# Patient Record
Sex: MALE | Race: WHITE
[De-identification: names, ages, dates, MRNs, and addresses within clinical notes are randomized per-mention and may not be internally consistent; named-entity substitution may affect disease eponyms.]

---

## 2019-07-29 ENCOUNTER — HOSPITAL ENCOUNTER (EMERGENCY)
Dept: HOSPITAL 47 - EC | Age: 69
Discharge: HOME | End: 2019-07-29
Payer: COMMERCIAL

## 2019-07-29 VITALS
TEMPERATURE: 97.9 F | SYSTOLIC BLOOD PRESSURE: 145 MMHG | DIASTOLIC BLOOD PRESSURE: 75 MMHG | HEART RATE: 97 BPM | RESPIRATION RATE: 18 BRPM

## 2019-07-29 DIAGNOSIS — F17.200: ICD-10-CM

## 2019-07-29 DIAGNOSIS — J44.9: ICD-10-CM

## 2019-07-29 DIAGNOSIS — Z79.890: ICD-10-CM

## 2019-07-29 DIAGNOSIS — Z79.899: ICD-10-CM

## 2019-07-29 DIAGNOSIS — Z79.01: ICD-10-CM
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
## 2019-07-29 DIAGNOSIS — Z79.4: ICD-10-CM

## 2019-07-29 DIAGNOSIS — S59.912A: Primary | ICD-10-CM

## 2019-07-29 DIAGNOSIS — H54.61: ICD-10-CM

## 2019-07-29 DIAGNOSIS — E11.622: ICD-10-CM

## 2019-07-29 DIAGNOSIS — L97.919: ICD-10-CM

## 2019-07-29 DIAGNOSIS — Z86.718: ICD-10-CM

## 2019-07-29 DIAGNOSIS — I10: ICD-10-CM

## 2019-07-29 DIAGNOSIS — E78.5: ICD-10-CM

## 2019-07-29 PROCEDURE — 72125 CT NECK SPINE W/O DYE: CPT

## 2019-07-29 PROCEDURE — 71046 X-RAY EXAM CHEST 2 VIEWS: CPT

## 2019-07-29 PROCEDURE — 70450 CT HEAD/BRAIN W/O DYE: CPT

## 2019-07-29 PROCEDURE — 99285 EMERGENCY DEPT VISIT HI MDM: CPT
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
## 2019-08-19 ENCOUNTER — HOSPITAL ENCOUNTER (EMERGENCY)
Dept: HOSPITAL 47 - EC | Age: 69
Discharge: HOME | End: 2019-08-19
Payer: MEDICARE

## 2019-08-19 VITALS — DIASTOLIC BLOOD PRESSURE: 80 MMHG | HEART RATE: 89 BPM | SYSTOLIC BLOOD PRESSURE: 129 MMHG

## 2019-08-19 VITALS — TEMPERATURE: 98.6 F | RESPIRATION RATE: 16 BRPM

## 2019-08-19 DIAGNOSIS — Z79.890: ICD-10-CM

## 2019-08-19 DIAGNOSIS — Z79.01: ICD-10-CM

## 2019-08-19 DIAGNOSIS — F17.200: ICD-10-CM

## 2019-08-19 DIAGNOSIS — Z79.899: ICD-10-CM

## 2019-08-19 DIAGNOSIS — I10: ICD-10-CM

## 2019-08-19 DIAGNOSIS — E11.649: Primary | ICD-10-CM

## 2019-08-19 DIAGNOSIS — Z79.4: ICD-10-CM

## 2019-08-19 DIAGNOSIS — Z86.718: ICD-10-CM

## 2019-08-19 DIAGNOSIS — E78.5: ICD-10-CM

## 2019-08-19 LAB
ALBUMIN SERPL-MCNC: 4.1 G/DL (ref 3.5–5)
ALP SERPL-CCNC: 99 U/L (ref 38–126)
ALT SERPL-CCNC: 7 U/L (ref 21–72)
ANION GAP SERPL CALC-SCNC: 8 MMOL/L
AST SERPL-CCNC: 34 U/L (ref 17–59)
BASOPHILS # BLD AUTO: 0.1 K/UL (ref 0–0.2)
BASOPHILS NFR BLD AUTO: 0 %
BUN SERPL-SCNC: 16 MG/DL (ref 9–20)
CALCIUM SPEC-MCNC: 8.8 MG/DL (ref 8.4–10.2)
CHLORIDE SERPL-SCNC: 102 MMOL/L (ref 98–107)
CO2 SERPL-SCNC: 29 MMOL/L (ref 22–30)
EOSINOPHIL # BLD AUTO: 0.2 K/UL (ref 0–0.7)
EOSINOPHIL NFR BLD AUTO: 1 %
ERYTHROCYTE [DISTWIDTH] IN BLOOD BY AUTOMATED COUNT: 4.54 M/UL (ref 4.3–5.9)
ERYTHROCYTE [DISTWIDTH] IN BLOOD: 14.9 % (ref 11.5–15.5)
GLUCOSE SERPL-MCNC: 96 MG/DL (ref 74–99)
HCT VFR BLD AUTO: 41.1 % (ref 39–53)
HGB BLD-MCNC: 14.2 GM/DL (ref 13–17.5)
LYMPHOCYTES # SPEC AUTO: 1.2 K/UL (ref 1–4.8)
LYMPHOCYTES NFR SPEC AUTO: 10 %
MAGNESIUM SPEC-SCNC: 1.9 MG/DL (ref 1.6–2.3)
MCH RBC QN AUTO: 31.4 PG (ref 25–35)
MCHC RBC AUTO-ENTMCNC: 34.7 G/DL (ref 31–37)
MCV RBC AUTO: 90.6 FL (ref 80–100)
MONOCYTES # BLD AUTO: 0.5 K/UL (ref 0–1)
MONOCYTES NFR BLD AUTO: 4 %
NEUTROPHILS # BLD AUTO: 9.4 K/UL (ref 1.3–7.7)
NEUTROPHILS NFR BLD AUTO: 83 %
PLATELET # BLD AUTO: 251 K/UL (ref 150–450)
POTASSIUM SERPL-SCNC: 5 MMOL/L (ref 3.5–5.1)
PROT SERPL-MCNC: 7 G/DL (ref 6.3–8.2)
SODIUM SERPL-SCNC: 139 MMOL/L (ref 137–145)
WBC # BLD AUTO: 11.3 K/UL (ref 3.8–10.6)

## 2019-08-19 PROCEDURE — 85025 COMPLETE CBC W/AUTO DIFF WBC: CPT

## 2019-08-19 PROCEDURE — 96360 HYDRATION IV INFUSION INIT: CPT

## 2019-08-19 PROCEDURE — 84100 ASSAY OF PHOSPHORUS: CPT

## 2019-08-19 PROCEDURE — 83735 ASSAY OF MAGNESIUM: CPT

## 2019-08-19 PROCEDURE — 99285 EMERGENCY DEPT VISIT HI MDM: CPT

## 2019-08-19 PROCEDURE — 36415 COLL VENOUS BLD VENIPUNCTURE: CPT

## 2019-08-19 PROCEDURE — 82009 KETONE BODYS QUAL: CPT

## 2019-08-19 PROCEDURE — 80053 COMPREHEN METABOLIC PANEL: CPT
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
## 2020-10-19 ENCOUNTER — HOSPITAL ENCOUNTER (OUTPATIENT)
Dept: HOSPITAL 47 - BARWHC3 | Age: 70
Discharge: HOME | End: 2020-10-19
Attending: SURGERY
Payer: MEDICARE

## 2020-10-19 VITALS
SYSTOLIC BLOOD PRESSURE: 159 MMHG | DIASTOLIC BLOOD PRESSURE: 84 MMHG | TEMPERATURE: 98.1 F | HEART RATE: 86 BPM | RESPIRATION RATE: 18 BRPM

## 2020-10-19 VITALS — BODY MASS INDEX: 35.8 KG/M2

## 2020-10-19 DIAGNOSIS — F17.200: ICD-10-CM

## 2020-10-19 DIAGNOSIS — Z98.84: ICD-10-CM

## 2020-10-19 DIAGNOSIS — Z46.51: Primary | ICD-10-CM

## 2020-10-19 PROCEDURE — 99212 OFFICE O/P EST SF 10 MIN: CPT

## 2020-10-19 NOTE — P.HPBAR
Bariatric H&P





- History & Physicial


H&P Date: 10/19/20


History & Physicial: 


Visit/CC: lap band f/u





Patient initial contact: 





Initial weight: 


Initial weight in pounds: 


Height: 5 ft 8 in


Initial BMI: 





Last weight: 





Current weight: 106.866 kg


Current weight in pounds: 235.60


Current BMI: 35.8





Ideal body weight (based on NIH guidelines): 69.853 kg





Excess body weight loss: 








The patient is a 70 year-old M who presents for Bariatric Assessment.  Patient 

presents today for lap band follow up.  He has complaints of some epigastric 

pain and intermittent dysphagia previous requesting fluid removed.














Past Medical History


Past Medical History: COPD, Diabetes Mellitus, Deep Vein Thrombosis (DVT), 

Hyperlipidemia, Hypertension


Additional Past Medical History / Comment(s): blind in r eye,  fractured neck


History of Any Multi-Drug Resistant Organisms: None Reported


Past Surgical History: Bariatric Surgery


Additional Past Surgical History / Comment(s): back surg neck surg ; thyroid 

removed; lap band 2010


Past Anesthesia/Blood Transfusion Reactions: No Reported Reaction


Past Psychological History: No Psychological Hx Reported


Smoking Status: Current some day smoker


Past Alcohol Use History: Rare


Past Drug Use History: None Reported


Additional Drug Use History / Comment(s): smokes cigars





Surgical - Exam


                                   Vital Signs











Temp Pulse Resp BP


 


 98.1 F   86   18   159/84 


 


 10/19/20 13:32  10/19/20 13:32  10/19/20 13:32  10/19/20 13:32














- General


well developed, well nourished





- Eyes


PERRL





- ENT


normal pinna





- Neck


no masses





- Respiratory


normal expansion





- Cardiovascular


Rhythm: regular





- Abdomen


Abdomen: soft, non tender





Bariatric Assessment & Plan


Plan: 





Patient's lap band was emptied.  He had 11 mL remove the band.  He'll follow-up 

in 4 weeks.





Bariatric Checklist


Checklist: 


Plan: 





Checklist: 





EGD: 


1. Hiatal hernia: 


2. H. Pylori: 





HgbA1c: 





Vitamin D: 





Smoking: Current some day smoker





Primary care physician referral: Dr Calderón





Psychiatry clearance: 





Cardiology clearance: 





Sleep study: 





Diet journal: 





VTE risk score: 





VTE risk level: 





Rehab needs at discharge:

## 2020-11-23 ENCOUNTER — HOSPITAL ENCOUNTER (OUTPATIENT)
Dept: HOSPITAL 47 - BARWHC3 | Age: 70
Discharge: HOME | End: 2020-11-23
Attending: SURGERY
Payer: MEDICARE

## 2020-11-23 VITALS
HEART RATE: 81 BPM | TEMPERATURE: 97.4 F | SYSTOLIC BLOOD PRESSURE: 134 MMHG | DIASTOLIC BLOOD PRESSURE: 64 MMHG | RESPIRATION RATE: 18 BRPM

## 2020-11-23 DIAGNOSIS — Z48.815: Primary | ICD-10-CM

## 2020-11-23 DIAGNOSIS — Z98.84: ICD-10-CM

## 2020-11-23 PROCEDURE — 99211 OFF/OP EST MAY X REQ PHY/QHP: CPT

## 2020-12-03 NOTE — P.HPBAR
Bariatric H&P





- History & Physicial


H&P Date: 12/03/20


History & Physicial: 


Visit/CC: lap band follow up





Patient initial contact: 





Initial weight: 


Initial weight in pounds: 


Height: 5 ft 8 in


Initial BMI: 





Last weight: 





Current weight: 105.37 kg


Current weight in pounds: 


Current BMI: 





Ideal body weight (based on NIH guidelines): 





Excess body weight loss: 








The patient is a 70 year-old M who presents for Bariatric Assessment.  Patient 

presents today for LAP-BAND follow-up.  He has had improvement in his GERD 

symptoms.





Past Medical History


Past Medical History: COPD, Diabetes Mellitus, Deep Vein Thrombosis (DVT), 

Hyperlipidemia, Hypertension


Additional Past Medical History / Comment(s): blind in r eye,  fractured neck


History of Any Multi-Drug Resistant Organisms: None Reported


Past Surgical History: Bariatric Surgery


Additional Past Surgical History / Comment(s): back surg neck surg ; thyroid 

removed; lap band 2010


Past Anesthesia/Blood Transfusion Reactions: No Reported Reaction


Past Psychological History: No Psychological Hx Reported


Smoking Status: Current some day smoker


Past Alcohol Use History: Rare


Past Drug Use History: None Reported


Additional Drug Use History / Comment(s): smokes cigars





Surgical - Exam


                                   Vital Signs











Temp Pulse Resp BP


 


 97.4 F L  81   18   134/64 


 


 11/23/20 13:57  11/23/20 13:57  11/23/20 13:57  11/23/20 13:57














- General


well developed, well nourished, no distress





- Eyes


PERRL





- ENT


normal pinna





- Neck


no masses





- Respiratory


normal expansion





- Cardiovascular


Rhythm: regular





- Abdomen


Abdomen: soft, non tender





Bariatric Assessment & Plan


Plan: 





Status post lap band procedure.  Patient's had some GERD.  His GERD is improved.

 He will follow-up in 2 weeks for a fill of his band at that time if his GERD 

symptoms are still improved.





Bariatric Checklist


Checklist: 


Plan: 





Checklist: 





EGD: 


1. Hiatal hernia: 


2. H. Pylori: 





HgbA1c: 





Vitamin D: 





Smoking: Current some day smoker





Primary care physician referral: Dr Calderón





Psychiatry clearance: 





Cardiology clearance: 





Sleep study: 





Diet journal: 





VTE risk score: 





VTE risk level: 





Rehab needs at discharge:

## 2020-12-07 ENCOUNTER — HOSPITAL ENCOUNTER (OUTPATIENT)
Dept: HOSPITAL 47 - BARWHC3 | Age: 70
Discharge: HOME | End: 2020-12-07
Attending: SURGERY
Payer: MEDICARE

## 2020-12-07 VITALS
DIASTOLIC BLOOD PRESSURE: 79 MMHG | SYSTOLIC BLOOD PRESSURE: 146 MMHG | TEMPERATURE: 98 F | RESPIRATION RATE: 18 BRPM | HEART RATE: 89 BPM

## 2020-12-07 DIAGNOSIS — Z46.51: Primary | ICD-10-CM

## 2020-12-07 LAB — GLUCOSE BLD-MCNC: 267 MG/DL (ref 75–99)

## 2020-12-07 PROCEDURE — 99211 OFF/OP EST MAY X REQ PHY/QHP: CPT

## 2020-12-07 NOTE — P.HPBAR
Bariatric H&P





- History & Physicial


H&P Date: 12/07/20


History & Physicial: 


Visit/CC: follow up / lap band





Patient initial contact: 





Initial weight: 


Initial weight in pounds: 


Height: 5 ft 8 in


Initial BMI: 





Last weight: 





Current weight: 109.316 kg


Current weight in pounds: 


Current BMI: 





Ideal body weight (based on NIH guidelines): 





Excess body weight loss: 








The patient is a 70 year-old M who presents for Bariatric Assessment.  Patient 

presents for lap band follow.  He had his LAP-BAND M.D. last visit.  He states 

his GERD symptoms have resolved.  He is not requesting a fill.  He is clean 10 

pounds since last month.














Past Medical History


Past Medical History: COPD, Diabetes Mellitus, Deep Vein Thrombosis (DVT), 

Hyperlipidemia, Hypertension


Additional Past Medical History / Comment(s): blind in r eye,  fractured neck


History of Any Multi-Drug Resistant Organisms: None Reported


Past Surgical History: Bariatric Surgery


Additional Past Surgical History / Comment(s): back surg neck surg ; thyroid 

removed; lap band 2010


Past Anesthesia/Blood Transfusion Reactions: No Reported Reaction


Past Psychological History: No Psychological Hx Reported


Smoking Status: Current some day smoker


Past Alcohol Use History: Rare


Past Drug Use History: None Reported


Additional Drug Use History / Comment(s): smokes cigars





Surgical - Exam


                                   Vital Signs











Temp Pulse Resp BP


 


 98 F   89   18   146/79 


 


 12/07/20 14:06  12/07/20 14:06  12/07/20 14:06  12/07/20 14:06














- General


well developed, well nourished, no distress





- Eyes


PERRL





- ENT


normal pinna





- Neck


no masses





- Respiratory


normal expansion





- Cardiovascular


Rhythm: regular





- Abdomen


Abdomen: soft, non tender





Results





- Labs


                  Abnormal Lab Results - Last 24 Hours (Table)











  12/07/20 Range/Units





  14:18 


 


POC Glucose (mg/dL)  267 H  (75-99)  mg/dL














Bariatric Assessment & Plan


Plan: 





Patient's GERD symptoms are improved.  He'll follow-up in one month.  The plan 

for adjustment of his band at that time.





Bariatric Checklist


Checklist: 


Plan: 





Checklist: 





EGD: 


1. Hiatal hernia: 


2. H. Pylori: 





HgbA1c: 





Vitamin D: 





Smoking: Current some day smoker





Primary care physician referral: Dr Calderón





Psychiatry clearance: 





Cardiology clearance: 





Sleep study: 





Diet journal: 





VTE risk score: 





VTE risk level: 





Rehab needs at discharge:

## 2021-01-11 ENCOUNTER — HOSPITAL ENCOUNTER (OUTPATIENT)
Dept: HOSPITAL 47 - BARWHC3 | Age: 71
Discharge: HOME | End: 2021-01-11
Attending: SURGERY
Payer: MEDICARE

## 2021-01-11 VITALS
HEART RATE: 84 BPM | SYSTOLIC BLOOD PRESSURE: 176 MMHG | DIASTOLIC BLOOD PRESSURE: 75 MMHG | TEMPERATURE: 97.9 F | RESPIRATION RATE: 18 BRPM

## 2021-01-11 VITALS — BODY MASS INDEX: 36.3 KG/M2

## 2021-01-11 DIAGNOSIS — F17.210: ICD-10-CM

## 2021-01-11 DIAGNOSIS — Z46.51: Primary | ICD-10-CM

## 2021-01-11 DIAGNOSIS — Z98.84: ICD-10-CM

## 2021-01-11 PROCEDURE — 99211 OFF/OP EST MAY X REQ PHY/QHP: CPT

## 2021-01-12 NOTE — P.HPBAR
Bariatric H&P





- History & Physicial


H&P Date: 01/11/21


History & Physicial: 


Visit/CC: follow up / band adjustment





Patient initial contact: 





Initial weight: 


Initial weight in pounds: 


Height: 5 ft 8 in


Initial BMI: 





Last weight: 





Current weight: 108.409 kg


Current weight in pounds: 239.00


Current BMI: 36.3





Ideal body weight (based on NIH guidelines): 69.853 kg





Excess body weight loss: 





Patient presents today for LAP-BAND follow-up.  He has not been seen in a while.

 He states he's had some minimal GERD.  He does not have any fluid removed from 

his band.


The patient is a 70 year-old M who presents for Bariatric Assessment.














Past Medical History


Past Medical History: COPD, Diabetes Mellitus, Deep Vein Thrombosis (DVT), 

Hyperlipidemia, Hypertension


Additional Past Medical History / Comment(s): blind in r eye,  fractured neck


History of Any Multi-Drug Resistant Organisms: None Reported


Past Surgical History: Bariatric Surgery


Additional Past Surgical History / Comment(s): back surg neck surg ; thyroid 

removed; lap band 2010


Past Anesthesia/Blood Transfusion Reactions: No Reported Reaction


Past Psychological History: No Psychological Hx Reported


Smoking Status: Current some day smoker


Past Alcohol Use History: Rare


Past Drug Use History: None Reported


Additional Drug Use History / Comment(s): smokes cigars





Surgical - Exam


                                   Vital Signs











Temp Pulse Resp BP


 


 97.9 F   84   18   176/75 


 


 01/11/21 14:29  01/11/21 14:29  01/11/21 14:29  01/11/21 14:29














- General


well developed, no distress





- Eyes


PERRL





- ENT


normal pinna





- Neck


no masses





- Respiratory


normal expansion





- Cardiovascular


Rhythm: regular





- Abdomen


Abdomen: soft, non tender





Bariatric Assessment & Plan


Plan: 





Status post lap band surgery.  Patient is doing fairly well.  His BMI is 36.  

His GERD is minimal and will be observed.  He'll follow-up in 8 weeks.





Bariatric Checklist


Checklist: 


Plan: 





Checklist: 





EGD: 


1. Hiatal hernia: 


2. H. Pylori: 





HgbA1c: 





Vitamin D: 





Smoking: Current some day smoker





Primary care physician referral: Dr Calderón





Psychiatry clearance: 





Cardiology clearance: 





Sleep study: 





Diet journal: 





VTE risk score: 





VTE risk level: 





Rehab needs at discharge: